# Patient Record
Sex: FEMALE | Race: WHITE | Employment: OTHER | URBAN - METROPOLITAN AREA
[De-identification: names, ages, dates, MRNs, and addresses within clinical notes are randomized per-mention and may not be internally consistent; named-entity substitution may affect disease eponyms.]

---

## 2018-06-28 NOTE — PROGRESS NOTES
Patient pre-assessment complete for [facility] poss with Dr Brennan Desai scheduled for 18 at 12:30pm, arrival time 10:30am. Patient verified using . Patient instructed to bring all home medications in labeled bottles on the day of procedure. NPO status reinforced. Patient informed to take a full dose aspirin 325mg  or 81 mg x 4 on the day of procedure. Instructed they can take all other medications excluding vitamins & supplements. Patient verbalizes understanding of all instructions & denies any questions at this time.

## 2018-06-29 ENCOUNTER — HOSPITAL ENCOUNTER (OUTPATIENT)
Dept: CARDIAC CATH/INVASIVE PROCEDURES | Age: 73
Discharge: HOME | End: 2018-06-29
Attending: INTERNAL MEDICINE | Admitting: INTERNAL MEDICINE
Payer: MEDICARE

## 2018-06-29 VITALS
WEIGHT: 186 LBS | OXYGEN SATURATION: 98 % | BODY MASS INDEX: 27.55 KG/M2 | HEART RATE: 75 BPM | TEMPERATURE: 98.1 F | HEIGHT: 69 IN | RESPIRATION RATE: 18 BRPM | SYSTOLIC BLOOD PRESSURE: 133 MMHG | DIASTOLIC BLOOD PRESSURE: 71 MMHG

## 2018-06-29 LAB
ALBUMIN SERPL-MCNC: 4.1 G/DL (ref 3.2–4.6)
ALBUMIN/GLOB SERPL: 0.9 {RATIO} (ref 1.2–3.5)
ALP SERPL-CCNC: 62 U/L (ref 50–136)
ALT SERPL-CCNC: 29 U/L (ref 12–65)
ANION GAP SERPL CALC-SCNC: 10 MMOL/L (ref 7–16)
AST SERPL-CCNC: 31 U/L (ref 15–37)
BILIRUB SERPL-MCNC: 1 MG/DL (ref 0.2–1.1)
BUN SERPL-MCNC: 13 MG/DL (ref 8–23)
CALCIUM SERPL-MCNC: 9.4 MG/DL (ref 8.3–10.4)
CHLORIDE SERPL-SCNC: 104 MMOL/L (ref 98–107)
CO2 SERPL-SCNC: 25 MMOL/L (ref 21–32)
CREAT SERPL-MCNC: 1.12 MG/DL (ref 0.6–1)
ERYTHROCYTE [DISTWIDTH] IN BLOOD BY AUTOMATED COUNT: 12.7 % (ref 11.9–14.6)
GLOBULIN SER CALC-MCNC: 4.4 G/DL (ref 2.3–3.5)
GLUCOSE SERPL-MCNC: 99 MG/DL (ref 65–100)
HCT VFR BLD AUTO: 44.4 % (ref 35.8–46.3)
HGB BLD-MCNC: 15.5 G/DL (ref 11.7–15.4)
INR PPP: 1
MCH RBC QN AUTO: 30.3 PG (ref 26.1–32.9)
MCHC RBC AUTO-ENTMCNC: 34.9 G/DL (ref 31.4–35)
MCV RBC AUTO: 86.9 FL (ref 79.6–97.8)
PLATELET # BLD AUTO: 233 K/UL (ref 150–450)
PMV BLD AUTO: 10.1 FL (ref 10.8–14.1)
POTASSIUM SERPL-SCNC: 3.9 MMOL/L (ref 3.5–5.1)
PROT SERPL-MCNC: 8.5 G/DL (ref 6.3–8.2)
PROTHROMBIN TIME: 13.1 SEC (ref 11.5–14.5)
RBC # BLD AUTO: 5.11 M/UL (ref 4.05–5.25)
SODIUM SERPL-SCNC: 139 MMOL/L (ref 136–145)
WBC # BLD AUTO: 7.2 K/UL (ref 4.3–11.1)

## 2018-06-29 PROCEDURE — 99152 MOD SED SAME PHYS/QHP 5/>YRS: CPT

## 2018-06-29 PROCEDURE — 85027 COMPLETE CBC AUTOMATED: CPT | Performed by: INTERNAL MEDICINE

## 2018-06-29 PROCEDURE — 74011000250 HC RX REV CODE- 250: Performed by: INTERNAL MEDICINE

## 2018-06-29 PROCEDURE — 74011250636 HC RX REV CODE- 250/636: Performed by: INTERNAL MEDICINE

## 2018-06-29 PROCEDURE — 74011250636 HC RX REV CODE- 250/636

## 2018-06-29 PROCEDURE — 77030004534 HC CATH ANGI DX INFN CARD -A

## 2018-06-29 PROCEDURE — 77030019569 HC BND COMPR RAD TERU -B

## 2018-06-29 PROCEDURE — 93005 ELECTROCARDIOGRAM TRACING: CPT | Performed by: INTERNAL MEDICINE

## 2018-06-29 PROCEDURE — 74011636320 HC RX REV CODE- 636/320: Performed by: INTERNAL MEDICINE

## 2018-06-29 PROCEDURE — 85610 PROTHROMBIN TIME: CPT | Performed by: INTERNAL MEDICINE

## 2018-06-29 PROCEDURE — 93458 L HRT ARTERY/VENTRICLE ANGIO: CPT

## 2018-06-29 PROCEDURE — C1894 INTRO/SHEATH, NON-LASER: HCPCS

## 2018-06-29 PROCEDURE — C1769 GUIDE WIRE: HCPCS

## 2018-06-29 PROCEDURE — 80053 COMPREHEN METABOLIC PANEL: CPT | Performed by: INTERNAL MEDICINE

## 2018-06-29 PROCEDURE — 77030004558 HC CATH ANGI DX SUPR TORQ CARD -A

## 2018-06-29 RX ORDER — FENTANYL CITRATE 50 UG/ML
25-50 INJECTION, SOLUTION INTRAMUSCULAR; INTRAVENOUS
Status: DISCONTINUED | OUTPATIENT
Start: 2018-06-29 | End: 2018-06-29 | Stop reason: HOSPADM

## 2018-06-29 RX ORDER — LIDOCAINE HYDROCHLORIDE 20 MG/ML
1-20 INJECTION, SOLUTION INFILTRATION; PERINEURAL
Status: DISCONTINUED | OUTPATIENT
Start: 2018-06-29 | End: 2018-06-29 | Stop reason: HOSPADM

## 2018-06-29 RX ORDER — HEPARIN SODIUM 200 [USP'U]/100ML
3 INJECTION, SOLUTION INTRAVENOUS CONTINUOUS
Status: DISCONTINUED | OUTPATIENT
Start: 2018-06-29 | End: 2018-06-29 | Stop reason: HOSPADM

## 2018-06-29 RX ORDER — ACETAMINOPHEN 325 MG/1
650 TABLET ORAL
Status: CANCELLED | OUTPATIENT
Start: 2018-06-29

## 2018-06-29 RX ORDER — SODIUM CHLORIDE 0.9 % (FLUSH) 0.9 %
5-10 SYRINGE (ML) INJECTION AS NEEDED
Status: CANCELLED | OUTPATIENT
Start: 2018-06-29

## 2018-06-29 RX ORDER — HEPARIN SODIUM 10000 [USP'U]/ML
40-80 INJECTION, SOLUTION INTRAVENOUS; SUBCUTANEOUS
Status: DISCONTINUED | OUTPATIENT
Start: 2018-06-29 | End: 2018-06-29 | Stop reason: HOSPADM

## 2018-06-29 RX ORDER — SODIUM CHLORIDE 9 MG/ML
75 INJECTION, SOLUTION INTRAVENOUS CONTINUOUS
Status: CANCELLED | OUTPATIENT
Start: 2018-06-29

## 2018-06-29 RX ORDER — SODIUM CHLORIDE 0.9 % (FLUSH) 0.9 %
5-10 SYRINGE (ML) INJECTION EVERY 8 HOURS
Status: CANCELLED | OUTPATIENT
Start: 2018-06-29

## 2018-06-29 RX ORDER — MIDAZOLAM HYDROCHLORIDE 1 MG/ML
.5-2 INJECTION, SOLUTION INTRAMUSCULAR; INTRAVENOUS
Status: DISCONTINUED | OUTPATIENT
Start: 2018-06-29 | End: 2018-06-29 | Stop reason: HOSPADM

## 2018-06-29 RX ORDER — SODIUM CHLORIDE 9 MG/ML
75 INJECTION, SOLUTION INTRAVENOUS CONTINUOUS
Status: DISCONTINUED | OUTPATIENT
Start: 2018-06-29 | End: 2018-06-29 | Stop reason: HOSPADM

## 2018-06-29 RX ORDER — DIAZEPAM 5 MG/1
5 TABLET ORAL ONCE
Status: DISCONTINUED | OUTPATIENT
Start: 2018-06-29 | End: 2018-06-29 | Stop reason: HOSPADM

## 2018-06-29 RX ORDER — ATORVASTATIN CALCIUM 40 MG/1
40 TABLET, FILM COATED ORAL DAILY
Qty: 30 TAB | Refills: 11 | Status: SHIPPED | OUTPATIENT
Start: 2018-06-29 | End: 2019-10-02

## 2018-06-29 RX ORDER — NALOXONE HYDROCHLORIDE 0.4 MG/ML
0.4 INJECTION, SOLUTION INTRAMUSCULAR; INTRAVENOUS; SUBCUTANEOUS AS NEEDED
Status: CANCELLED | OUTPATIENT
Start: 2018-06-29

## 2018-06-29 RX ORDER — ONDANSETRON 2 MG/ML
4 INJECTION INTRAMUSCULAR; INTRAVENOUS
Status: CANCELLED | OUTPATIENT
Start: 2018-06-29 | End: 2018-06-30

## 2018-06-29 RX ADMIN — HEPARIN SODIUM 3000 UNITS: 10000 INJECTION, SOLUTION INTRAVENOUS; SUBCUTANEOUS at 13:29

## 2018-06-29 RX ADMIN — HEPARIN SODIUM 3 ML/HR: 5000 INJECTION, SOLUTION INTRAVENOUS; SUBCUTANEOUS at 12:49

## 2018-06-29 RX ADMIN — MIDAZOLAM HYDROCHLORIDE 1 MG: 1 INJECTION, SOLUTION INTRAMUSCULAR; INTRAVENOUS at 13:18

## 2018-06-29 RX ADMIN — SODIUM CHLORIDE 75 ML/HR: 900 INJECTION, SOLUTION INTRAVENOUS at 11:26

## 2018-06-29 RX ADMIN — IOPAMIDOL 60 ML: 755 INJECTION, SOLUTION INTRAVENOUS at 13:32

## 2018-06-29 RX ADMIN — HEPARIN SODIUM 2 ML: 10000 INJECTION, SOLUTION INTRAVENOUS; SUBCUTANEOUS at 13:18

## 2018-06-29 RX ADMIN — MIDAZOLAM HYDROCHLORIDE 1 MG: 1 INJECTION, SOLUTION INTRAMUSCULAR; INTRAVENOUS at 13:15

## 2018-06-29 RX ADMIN — LIDOCAINE HYDROCHLORIDE 60 MG: 20 INJECTION, SOLUTION INFILTRATION; PERINEURAL at 13:18

## 2018-06-29 NOTE — PROGRESS NOTES
Pt arrived, ambulated to room with no visible problems, planned [facility] for Dr Fernandez Villa. Consent signed, Procedure discussed with pt all questions answered voiced understanding. Medications and history discussed with pt. Pt prepped per ordersThe patient has a fraility score of 3-MANAGING WELL, based on ability to complete adls without assistance, symptoms increase with exertion      Pt stated she is allergic to asa. Dr Fernandez Villa aware.

## 2018-06-29 NOTE — DISCHARGE INSTRUCTIONS

## 2018-06-29 NOTE — IP AVS SNAPSHOT
[facility] 
 
 
 [address] [facility] 
[phone] Patient: Yana Martinez MRN: [id number] Discharge Summary 6/29/2018 Yana Martinez MRN[de-identified]  [id number] Admission Information Provider Pager Service Admission Date Expected D/C Date Saida Paulson MD  CARDIAC CATH LAB 6/29/2018 6/29/2018 Actual LOS Patient Class 0 days OUTPATIENT Follow-up Information Follow up With Details Comments Contact Info Shade Singh MD Schedule an appointment as soon as possible for a visit in 2 weeks  [address] [place] [address] Saida Paulson MD On 7/24/2018 at 11:45AM in the [facility] office, for heart cath follow-up [address] [place] [address] 
[phone] My Medications STOP taking these medications   
 lovastatin 40 mg tablet Commonly known as:  MEVACOR  
   
  
  
TAKE these medications as instructed Instructions Each Dose to Equal  
 Morning Noon Evening Bedtime  
 amLODIPine 2.5 mg tablet Commonly known as:  Lennis Eagles Your last dose was: Your next dose is: Take 2.5 mg by mouth daily. 2.5 mg  
    
   
   
   
  
 atorvastatin 40 mg tablet Commonly known as:  LIPITOR Your last dose was: Your next dose is: Take 1 Tab by mouth daily. 40 mg Biotin 2,500 mcg Cap Your last dose was: Your next dose is: Take  by mouth daily. COQ10  PO Your last dose was: Your next dose is: Take 200 mg by mouth daily. 200 mg ESTRACE 0.5 mg tablet Generic drug:  estradiol Your last dose was: Your next dose is: Take 0.5 mg by mouth daily.   
 0.5 mg  
    
   
   
   
  
 FISH OIL 1,000 mg (120 mg-180 mg) capsule Generic drug:  omega 3-DHA-EPA-fish oil Your last dose was: Your next dose is: Take 1 Cap by mouth daily. 1 Cap OTHER Your last dose was: Your next dose is:    
   
   
 nightly. Magnesium - help w/ bowel movement VITAMIN D3 1,000 unit Cap Generic drug:  cholecalciferol Your last dose was: Your next dose is: Take 1,000 Units by mouth every other day. 1000 Units Where to Get Your Medications Information on where to get these meds will be given to you by the nurse or doctor. ! Ask your nurse or doctor about these medications  
  atorvastatin 40 mg tablet General Information Please provide this summary of care documentation to your next provider. Allergies High:  Aspirin;  Codeine Unspecified:  Latex; Penicillins Current Immunizations  Never Reviewed No immunizations on file. Discharge Instructions Discharge Instructions HEART CATHETERIZATION/ANGIOGRAPHY DISCHARGE INSTRUCTIONS 1. Check puncture site frequently for swelling or bleeding. If there is any bleeding, apply pressure over the area with a clean towel or washcloth. If you are unable to stop the bleeding in 15-20 minutes call 911. Notify your doctor for any redness, swelling, drainage, or oozing from the puncture site. Notify your doctor for any fever, chills or other signs of infection. 2. If the extremity becomes cold, numb, or painful call [address] [address] Cardiology at [phone]. 
3. Activity should be limited for the next 48 hours. Avoid pushing, pulling, or strenuous activity for 48 hours. No heavy lifting (anything over 5 pounds) for 3 days. No driving for 24 hours. 4. You may resume your usual diet.  Drink more fluids than usual, water is best. 
 5. Have a responsible person drive you home and stay with you for at least 24 hours after your heart catheterization/angiography. 6. You may remove bandage from your right wrist in 24 hours. You may shower in 24 hours. No tub baths, hot tubs, or swimming for 1 week. Do not wash dishes for 1 week. Do not place any lotions, creams, powders, or ointments over puncture site for 1 week. You may place a clean band-aid over the puncture site each day for 5 days. Change daily. I have read the above instructions and have had the opportunity to ask questions. Discharge Orders None  
  
` Patient Signature:  ____________________________________________________________ Date:  ____________________________________________________________  
  
 Dustin Russellug Provider Signature:  ____________________________________________________________ Date:  ____________________________________________________________

## 2018-06-29 NOTE — PROGRESS NOTES
Report received from Dilia Fair Cath Lab RN. Procedural findings communicated. Intra procedural  medication administration reviewed. Progression of care discussed.      Patient received into [facility] [place] 6 post sheath removal.     Right Radial access site without bleeding or swelling     TR band dry and intact     Patient instructed to limit movement to right upper extremity    Routine post procedural vital signs and site assessment initiated

## 2018-06-29 NOTE — PROCEDURES
Brief Cardiac Procedure Note    Patient: Karli Mcfadden MRN: [id number]  SSN: [id number]    YOB: 1945  Age: 74 y.o. Sex: female      Date of Procedure: 6/29/2018     Pre-procedure Diagnosis: Atypical Angina    Post-procedure Diagnosis: Coronary Artery Disease and Non-cardiac Chest Pain    Reason for Procedure: Suspected CAD    Procedure: Left Heart Catheterization    Brief Description of Procedure: LHC via R radial artery    Performed By: Mikayla Conrad MD     Assistants: None    Anesthesia: Moderate Sedation    Estimated Blood Loss: Less than 10 mL      Specimens: None    Implants: None    Findings: LM normal, LAD 30% prox, Circ normal, RCA normal    Complications: None    Recommendations: Continue medical therapy.     Signed By: Mikayla Conrad MD     June 29, 2018

## 2018-06-29 NOTE — PROCEDURES
[address]    Soy Garcia  MR#: [id number]  : 1945  ACCOUNT #: [de-identified]   DATE OF SERVICE: 2018    INDICATIONS:  The patient is a 27-year-old female who had signs and symptoms concerning for coronary artery ischemia. She underwent a stress test at [facility] that was found to be positive in the inferior and lateral walls. She was referred for heart catheterization to evaluate her coronary anatomy. BLOOD LOSS:  Less than 5 mL    SEDATION:  The patient was given 2 mg of Versed, and monitored for conscious sedation  beginning at 1310 and ending at 1330 by nurse, Alexa Verde. SPECIMENS:  None    COMPLICATIONS:  None    ASSISTANT:  None    Preprocedure timeout was completed. Mallampati score of 2, ASA score of 2. PROCEDURE:  After informed consent, the patient was prepped and draped in the usual sterile fashion. The right wrist was infiltrated with lidocaine. The right radial artery was accessed via the modified Seldinger technique with a 6-Barbadian sheath. A total of 60 mL of Visipaque contrast were used for the entire procedure. A Terumo band was used for hemostasis. CATHETERS USED:  Include:  A 6-Barbadian JL3.5, 6-Barbadian JR5 and a 6-Barbadian angled pigtail catheter. FINDINGS:  1. Left ventricle:  Left ventricular ejection fraction is estimated at 55%-60%, with normal wall motion. 2.  LVEDP:  10 mmHg  3. Left main widely patent. 4.  Left anterior descending artery:  Had a proximal 30% stenosis. 5.  Circumflex artery widely patent. 6.  Right coronary artery widely patent. CONCLUSION:  This is a 27-year-old female who had a false positive cardiac stress test, and has evidence of only mild nonobstructive coronary artery disease. ASSESSMENT AND PLAN:  Will change her to a high intensity statin, and will see her followed up in clinic in the next 4 weeks.       MD RAZIA Palma / Katia.Generous  D: 2018 13:54 T: 06/29/2018 18:32  JOB #: [id number]

## 2018-06-29 NOTE — PROGRESS NOTES
Discharge instructions given per orders, voiced good understanding of post radial cath care, medications & follow up care. Denies any questions.  Discharged to home via wheel chair

## 2018-06-29 NOTE — PROGRESS NOTES
TRANSFER - OUT REPORT:    Verbal report given to Anna Astudillo RN on Kailee Daughters  being transferred to [facility] for routine progression of care       Report consisted of patients Situation, Background, Assessment and Recommendations(SBAR). Information from the following report(s) SBAR, Kardex, Procedure Summary and MAR was reviewed with the receiving nurse. Opportunity for questions and clarification was provided.       [facility] with Dr Marquise Harris  No intervention  2 versed  Right radial RBand 14ml at [phone]

## 2018-06-30 LAB
ATRIAL RATE: 77 BPM
CALCULATED P AXIS, ECG09: 46 DEGREES
CALCULATED R AXIS, ECG10: 39 DEGREES
CALCULATED T AXIS, ECG11: 50 DEGREES
DIAGNOSIS, 93000: NORMAL
P-R INTERVAL, ECG05: 140 MS
Q-T INTERVAL, ECG07: 384 MS
QRS DURATION, ECG06: 78 MS
QTC CALCULATION (BEZET), ECG08: 434 MS
VENTRICULAR RATE, ECG03: 77 BPM

## 2018-07-24 PROBLEM — I25.10 CORONARY ARTERY DISEASE INVOLVING NATIVE CORONARY ARTERY OF NATIVE HEART WITHOUT ANGINA PECTORIS: Status: ACTIVE | Noted: 2018-07-24
